# Patient Record
Sex: FEMALE | Race: BLACK OR AFRICAN AMERICAN | NOT HISPANIC OR LATINO | URBAN - METROPOLITAN AREA
[De-identification: names, ages, dates, MRNs, and addresses within clinical notes are randomized per-mention and may not be internally consistent; named-entity substitution may affect disease eponyms.]

---

## 2021-09-10 ENCOUNTER — LAB OUTSIDE AN ENCOUNTER (OUTPATIENT)
Dept: URBAN - METROPOLITAN AREA CLINIC 113 | Facility: CLINIC | Age: 56
End: 2021-09-10

## 2021-09-10 ENCOUNTER — TELEPHONE ENCOUNTER (OUTPATIENT)
Dept: URBAN - METROPOLITAN AREA CLINIC 113 | Facility: CLINIC | Age: 56
End: 2021-09-10

## 2021-09-10 VITALS — BODY MASS INDEX: 23.82 KG/M2 | HEIGHT: 65 IN | WEIGHT: 143 LBS

## 2021-09-10 RX ORDER — SODIUM, POTASSIUM,MAG SULFATES 17.5-3.13G
354ML SOLUTION, RECONSTITUTED, ORAL ORAL
Qty: 354 MILLILITER | Refills: 0 | OUTPATIENT
Start: 2021-09-10 | End: 2021-09-11

## 2021-09-10 RX ORDER — FLUTICASONE PROPIONATE 50 UG/1
1 SPRAY IN EACH NOSTRIL SPRAY, METERED NASAL ONCE A DAY
Status: ACTIVE | COMMUNITY

## 2021-09-28 ENCOUNTER — LAB OUTSIDE AN ENCOUNTER (OUTPATIENT)
Dept: URBAN - METROPOLITAN AREA CLINIC 113 | Facility: CLINIC | Age: 56
End: 2021-09-28

## 2021-09-28 ENCOUNTER — TELEPHONE ENCOUNTER (OUTPATIENT)
Dept: URBAN - METROPOLITAN AREA CLINIC 113 | Facility: CLINIC | Age: 56
End: 2021-09-28

## 2021-09-28 ENCOUNTER — OFFICE VISIT (OUTPATIENT)
Dept: URBAN - METROPOLITAN AREA SURGERY CENTER 25 | Facility: SURGERY CENTER | Age: 56
End: 2021-09-28
Payer: COMMERCIAL

## 2021-09-28 DIAGNOSIS — D12.0 ADENOMA OF CECUM: ICD-10-CM

## 2021-09-28 DIAGNOSIS — Z12.11 COLON CANCER SCREENING: ICD-10-CM

## 2021-09-28 DIAGNOSIS — D12.7 BENIGN NEOPLASM OF RECTOSIGMOID JUNCTION: ICD-10-CM

## 2021-09-28 PROCEDURE — 45380 COLONOSCOPY AND BIOPSY: CPT | Performed by: INTERNAL MEDICINE

## 2021-09-28 PROCEDURE — G8907 PT DOC NO EVENTS ON DISCHARG: HCPCS | Performed by: INTERNAL MEDICINE

## 2021-09-28 RX ORDER — FLUTICASONE PROPIONATE 50 UG/1
1 SPRAY IN EACH NOSTRIL SPRAY, METERED NASAL ONCE A DAY
Status: ACTIVE | COMMUNITY

## 2021-10-05 LAB
COMMENT:: (no result)
CPT CODE(S):: (no result)
CPT DISCLAIMER:: (no result)
DIAGNOSIS SYNOPSIS:: (no result)
DIAGNOSIS:: (no result)
ENDOSCOPIC FINDINGS:: (no result)
Lab: (no result)
MICROSCOPIC DESCRIPTION:: (no result)
PATHOLOGIST PROVIDED ICD:: (no result)
SPECIMEN:: (no result)

## 2021-10-15 ENCOUNTER — OFFICE VISIT (OUTPATIENT)
Dept: URBAN - METROPOLITAN AREA CLINIC 113 | Facility: CLINIC | Age: 56
End: 2021-10-15
Payer: COMMERCIAL

## 2021-10-15 ENCOUNTER — WEB ENCOUNTER (OUTPATIENT)
Dept: URBAN - METROPOLITAN AREA CLINIC 113 | Facility: CLINIC | Age: 56
End: 2021-10-15

## 2021-10-15 VITALS
WEIGHT: 141 LBS | RESPIRATION RATE: 18 BRPM | HEIGHT: 65 IN | DIASTOLIC BLOOD PRESSURE: 91 MMHG | SYSTOLIC BLOOD PRESSURE: 140 MMHG | TEMPERATURE: 98.2 F | HEART RATE: 81 BPM | BODY MASS INDEX: 23.49 KG/M2

## 2021-10-15 DIAGNOSIS — K57.30 COLON, DIVERTICULOSIS: ICD-10-CM

## 2021-10-15 DIAGNOSIS — D12.6 TUBULOVILLOUS ADENOMA OF COLON: ICD-10-CM

## 2021-10-15 PROCEDURE — 99213 OFFICE O/P EST LOW 20 MIN: CPT | Performed by: INTERNAL MEDICINE

## 2021-10-15 RX ORDER — FLUTICASONE PROPIONATE 50 UG/1
1 SPRAY IN EACH NOSTRIL SPRAY, METERED NASAL ONCE A DAY
Status: ACTIVE | COMMUNITY

## 2021-10-15 NOTE — HPI-TODAY'S VISIT:
Ms Cuenca is a 56-year-old woman presenting for follow-up after undergoing colonoscopy for colorectal cancer screening. The colonoscopy was performed on 9/28/2021.  The findings were notable for a large laterally spreading tumor, mixed type, in the cecum status post biopsy showing tubulovillous adenoma and a 15-20 mm semisessile polyp in the proximal ascending colon, sigmoid colon diverticulosis with segmental erythema and erosions status post biopsy showing reactive epithelial changes without colitis, removal of 2 small rectosigmoid tubular adenomas, and grade 1 internal hemorrhoids. She is overall feeling fairly well.  She occasionally has some lower abdominal pain that is mild and cramping.  She denies any constipation, diarrhea, change in bowel habits, red blood per rectum or melena.  Her weight has been stable.  She also denies any nausea, vomiting, heartburn or difficulty swallowing.  There is no family history of colon polyps or colon cancer.

## 2021-10-17 PROBLEM — 733657002: Status: ACTIVE | Noted: 2021-10-17

## 2021-10-22 ENCOUNTER — TELEPHONE ENCOUNTER (OUTPATIENT)
Dept: URBAN - METROPOLITAN AREA CLINIC 113 | Facility: CLINIC | Age: 56
End: 2021-10-22

## 2021-11-04 ENCOUNTER — TELEPHONE ENCOUNTER (OUTPATIENT)
Dept: URBAN - METROPOLITAN AREA SURGERY CENTER 30 | Facility: SURGERY CENTER | Age: 56
End: 2021-11-04

## 2021-11-10 ENCOUNTER — OFFICE VISIT (OUTPATIENT)
Dept: URBAN - METROPOLITAN AREA CLINIC 113 | Facility: CLINIC | Age: 56
End: 2021-11-10

## 2022-02-01 ENCOUNTER — TELEPHONE ENCOUNTER (OUTPATIENT)
Dept: URBAN - METROPOLITAN AREA CLINIC 113 | Facility: CLINIC | Age: 57
End: 2022-02-01

## 2022-02-03 ENCOUNTER — WEB ENCOUNTER (OUTPATIENT)
Dept: URBAN - METROPOLITAN AREA CLINIC 107 | Facility: CLINIC | Age: 57
End: 2022-02-03

## 2022-02-03 ENCOUNTER — OFFICE VISIT (OUTPATIENT)
Dept: URBAN - METROPOLITAN AREA CLINIC 107 | Facility: CLINIC | Age: 57
End: 2022-02-03
Payer: COMMERCIAL

## 2022-02-03 VITALS
RESPIRATION RATE: 18 BRPM | DIASTOLIC BLOOD PRESSURE: 87 MMHG | SYSTOLIC BLOOD PRESSURE: 128 MMHG | HEIGHT: 65 IN | HEART RATE: 88 BPM | TEMPERATURE: 98.2 F | BODY MASS INDEX: 23.82 KG/M2 | WEIGHT: 143 LBS

## 2022-02-03 DIAGNOSIS — K63.5 POLYP OF CECUM: ICD-10-CM

## 2022-02-03 DIAGNOSIS — R19.7 DIARRHEA, UNSPECIFIED TYPE: ICD-10-CM

## 2022-02-03 DIAGNOSIS — R10.31 ABDOMINAL DISCOMFORT IN RIGHT LOWER QUADRANT: ICD-10-CM

## 2022-02-03 DIAGNOSIS — D12.6 TUBULOVILLOUS ADENOMA OF COLON: ICD-10-CM

## 2022-02-03 PROCEDURE — 99214 OFFICE O/P EST MOD 30 MIN: CPT | Performed by: PHYSICIAN ASSISTANT

## 2022-02-03 RX ORDER — FLUTICASONE PROPIONATE 50 UG/1
1 SPRAY IN EACH NOSTRIL SPRAY, METERED NASAL ONCE A DAY
Status: ACTIVE | COMMUNITY

## 2022-02-03 NOTE — HPI-TODAY'S VISIT:
Ms Sahni is a 56-year-old woman with history of colon diverticulosis, presenting for follow up.  She was last seen on 10/15/2021 for follow up after undergoing colonoscopy. This exam demonstrated 2 advanced adenomas in the cecum and proximal ascending colon. Right hemicolectomy vs referral to [facility] or [facility] for EMR was discussed. Patient preferred to hold off on referral at that time given increased stress at that time. She was instructed to maintain high fiber diet.  Per telephone encounter on 10/22/2021, referral to Dr. Grullon at [facility] was made.   She states she underwent EMR at [facility] on 1/27/2022.  The patient tells me that most of the polyp tissue was removed, however, she is scheduled for repeat colonoscopy in 6 months to reassess polypectomy site.  A few days after being discharged she began to experience loose stools.  She tells me that this only occurs with consuming large meals.  She was advised by [facility] staff to consume small frequent meals.  She also experienced an episode of nocturnal diarrhea at bedtime.  This has somewhat resolved.  She now complains of right lower quadrant discomfort describes as an "achy pressure" that is exacerbated with overexertion. The pain does not wake her from sleep and is somewhat improved with defecation.  She denies any fevers, chills or vomiting.  She denies red blood per rectum, melena or hematochezia.  Her appetite is normal.  Of note, she repeated questions if she should return to work next week. She feels she may need a "longer recovery" due to her recent "extensive surgery".

## 2022-04-05 ENCOUNTER — OFFICE VISIT (OUTPATIENT)
Dept: URBAN - METROPOLITAN AREA CLINIC 113 | Facility: CLINIC | Age: 57
End: 2022-04-05

## 2022-05-18 ENCOUNTER — WEB ENCOUNTER (OUTPATIENT)
Dept: URBAN - METROPOLITAN AREA CLINIC 113 | Facility: CLINIC | Age: 57
End: 2022-05-18

## 2022-05-18 ENCOUNTER — DASHBOARD ENCOUNTERS (OUTPATIENT)
Age: 57
End: 2022-05-18

## 2022-05-18 ENCOUNTER — TELEPHONE ENCOUNTER (OUTPATIENT)
Dept: URBAN - METROPOLITAN AREA CLINIC 113 | Facility: CLINIC | Age: 57
End: 2022-05-18

## 2022-05-18 ENCOUNTER — OFFICE VISIT (OUTPATIENT)
Dept: URBAN - METROPOLITAN AREA CLINIC 113 | Facility: CLINIC | Age: 57
End: 2022-05-18
Payer: COMMERCIAL

## 2022-05-18 VITALS
TEMPERATURE: 98.7 F | DIASTOLIC BLOOD PRESSURE: 94 MMHG | HEART RATE: 93 BPM | SYSTOLIC BLOOD PRESSURE: 156 MMHG | WEIGHT: 139 LBS | HEIGHT: 65 IN | BODY MASS INDEX: 23.16 KG/M2

## 2022-05-18 DIAGNOSIS — R11.2 NAUSEA AND VOMITING, UNSPECIFIED VOMITING TYPE: ICD-10-CM

## 2022-05-18 DIAGNOSIS — D12.6 TUBULOVILLOUS ADENOMA OF COLON: ICD-10-CM

## 2022-05-18 DIAGNOSIS — K57.90 DIVERTICULOSIS: ICD-10-CM

## 2022-05-18 DIAGNOSIS — R10.32 LEFT LOWER QUADRANT PAIN: ICD-10-CM

## 2022-05-18 DIAGNOSIS — K63.5 POLYP OF CECUM: ICD-10-CM

## 2022-05-18 PROBLEM — 397881000: Status: ACTIVE | Noted: 2022-05-18

## 2022-05-18 PROBLEM — 449833002: Status: ACTIVE | Noted: 2022-02-13

## 2022-05-18 PROBLEM — 62315008: Status: ACTIVE | Noted: 2022-02-13

## 2022-05-18 PROBLEM — 428054006: Status: ACTIVE | Noted: 2022-02-13

## 2022-05-18 PROCEDURE — 99214 OFFICE O/P EST MOD 30 MIN: CPT

## 2022-05-18 RX ORDER — HYOSCYAMINE SULFATE 0.12 MG/1
1 TABLET UNDER THE TONGUE AND ALLOW TO DISSOLVE  AS NEEDED TABLET SUBLINGUAL
Qty: 90 | Refills: 3 | OUTPATIENT
Start: 2022-05-18 | End: 2022-09-15

## 2022-05-18 RX ORDER — FLUTICASONE PROPIONATE 50 UG/1
1 SPRAY IN EACH NOSTRIL SPRAY, METERED NASAL ONCE A DAY
Status: ACTIVE | COMMUNITY

## 2022-05-18 NOTE — HPI-TODAY'S VISIT:
Ms Sahni is a 56-year-old woman with history of colon diverticulosis, presenting for follow up.  She was last seen on 10/15/2021 for follow up after undergoing colonoscopy. This exam demonstrated 2 advanced adenomas in the cecum and proximal ascending colon. Right hemicolectomy vs referral to [facility] or [facility] for EMR was discussed. Patient preferred to hold off on referral at that time given increased stress at that time. She was instructed to maintain high fiber diet.  Per telephone encounter on 10/22/2021, referral to Dr. Grullon at [facility] was made.   She states she underwent EMR at [facility] on 1/27/2022.  The patient tells me that most of the polyp tissue was removed, however, she is scheduled for repeat colonoscopy in 6 months to reassess polypectomy site.  A few days after being discharged she began to experience loose stools.  She tells me that this only occurs with consuming large meals.  She was advised by [facility] staff to consume small frequent meals.  She also experienced an episode of nocturnal diarrhea at bedtime.  This has somewhat resolved.  She now complains of right lower quadrant discomfort describes as an "achy pressure" that is exacerbated with overexertion. The pain does not wake her from sleep and is somewhat improved with defecation.  She denies any fevers, chills or vomiting.  She denies red blood per rectum, melena or hematochezia.  Her appetite is normal.  Of note, she repeated questions if she should return to work next week. She feels she may need a "longer recovery" due to her recent "extensive surgery".   Interval history: 56-year-old female with a history of advanced adenomas, diverticulosis presents for evaluation of possible diverticulitis flare.  She was last seen on 2/3/2022.  She was 1 week status post endoscopic mucosal resection of 2 advanced adenomas previously identified on screening colonoscopy in October 2021.  She was to follow-up with [facility] in 6 months for repeat colonoscopy to reassess polypectomy site and removal of residual adenomatous polyp tissue.  She did have diarrhea likely secondary to functional GI disorder.  Stool studies were deferred given relation to large meals.  She was recommended to add daily fiber to bowel regimen and continue daily probiotic.  She did have abdominal discomfort possibly musculoskeletal in etiology.  She was offered trial of Bentyl for which she declined.  She was recommended Tylenol as needed for pain and warm compresses.  Patient underwent colonoscopy with Dr. Monique on 5/10/2022 at [facility].  Hemorrhoids found on perianal exam.  Severe diverticulosis in the entire colon with narrowing of the colon in association with diverticular opening, no evidence of diverticular bleeding.  2 post mucosectomy scars in the proximal ascending colon with no evidence of previous polyp status post biopsy.  Pathology revealed colonic mucosa with minimal crypt branching and a lymphoid aggregate.  Negative for dysplasia.  Repeat colonoscopy recommended in 1 year for surveillance based on pathology results given history of tubulovillous adenoma with high-grade dysplasia.   Patient presents with her daughter. Patient states she had an EMR performed last week during her colonoscopy however there is no record of this. She has "severe deep abdominal pain focused in her LUQ and RLQ." The pain comes and goes. Nothing makes it better. She had a flare of diverticulitis in December 2021 which resolved with antibiotics. She believes she is having another flare. She had nausea and vomiting a few days ago which has since resolved. Appetite is normalizing. She does have regular bowel movements. She did see one episode of reddish brown stool at one time though she denies bright red blood. She denies fevers. She does have a hoarse voice following her colonoscopy. She states they perform general anesthesia each time.

## 2022-05-19 LAB
A/G RATIO: 1.9
ALBUMIN: 4.4
ALKALINE PHOSPHATASE: 52
ALT (SGPT): 49
AST (SGOT): 35
BASO (ABSOLUTE): 0
BASOS: 1
BILIRUBIN, TOTAL: 0.9
BUN/CREATININE RATIO: 8
BUN: 5
C-REACTIVE PROTEIN, QUANT: <1
CALCIUM: 9.2
CARBON DIOXIDE, TOTAL: 23
CHLORIDE: 102
CREATININE: 0.6
EGFR: 104
EOS (ABSOLUTE): 0
EOS: 0
GLOBULIN, TOTAL: 2.3
GLUCOSE: 112
HEMATOCRIT: 41.3
HEMATOLOGY COMMENTS:: (no result)
HEMOGLOBIN: 13.7
IMMATURE CELLS: (no result)
IMMATURE GRANS (ABS): 0
IMMATURE GRANULOCYTES: 0
LIPASE: 15
LYMPHS (ABSOLUTE): 2.2
LYMPHS: 60
MCH: 32.5
MCHC: 33.2
MCV: 98
MONOCYTES(ABSOLUTE): 0.3
MONOCYTES: 8
NEUTROPHILS (ABSOLUTE): 1.1
NEUTROPHILS: 31
NRBC: (no result)
PLATELETS: 325
POTASSIUM: 4
PROTEIN, TOTAL: 6.7
RBC: 4.22
RDW: 12.7
SODIUM: 142
WBC: 3.7

## 2022-06-13 ENCOUNTER — ERX REFILL RESPONSE (OUTPATIENT)
Dept: URBAN - METROPOLITAN AREA CLINIC 107 | Facility: CLINIC | Age: 57
End: 2022-06-13

## 2022-06-13 RX ORDER — HYOSCYAMINE SULFATE 0.12 MG/1
1 TABLET UNDER THE TONGUE AND ALLOW TO DISSOLVE  AS NEEDED TABLET SUBLINGUAL
Qty: 90 | Refills: 3 | OUTPATIENT

## 2022-06-13 RX ORDER — HYOSCYAMINE SULFATE 0.12 MG/1
1 TABLET UNDER THE TONGUE AND ALLOW TO DISSOLVE AS NEEDED SUBLINGUAL THREE TIMES A DAY AS NEEDED 30 DAYS TABLET ORAL; SUBLINGUAL
Qty: 90 TABLET | Refills: 3 | OUTPATIENT

## 2023-06-09 NOTE — PHYSICAL EXAM LUNGS:
RN called patient to go over SIN results;    FINDINGS:     The brachial pressures were 148 mmHg in the right arm and 159 mmHg in the  left arm.     In the right leg, the posterior tibial and dorsalis pedis pressures were  both noncompressible .  The right ankle-brachial index was noncalculable.   The waveforms were  multiphasic.  The right great toe pressure was 83 mmHg.     In the left leg, the posterior tibial and dorsalis pedis pressures were  both noncompressible .  The left ankle-brachial index was on calculable.   The waveforms were  multiphasic. The left great toe pressure was 84 mmHg.      Per Dr Galan she does not believe arterial insufficiency is contributing to her nocturnal symptoms. She recommend further work-up with PCP.     No answer, left message. Will forward this information to patient's PCP.    no increased work of breathing or signs of respiratory distress, clear to auscultation bilaterally